# Patient Record
Sex: FEMALE | Race: OTHER | NOT HISPANIC OR LATINO
[De-identification: names, ages, dates, MRNs, and addresses within clinical notes are randomized per-mention and may not be internally consistent; named-entity substitution may affect disease eponyms.]

---

## 2018-08-15 ENCOUNTER — TRANSCRIPTION ENCOUNTER (OUTPATIENT)
Age: 43
End: 2018-08-15

## 2018-10-18 ENCOUNTER — TRANSCRIPTION ENCOUNTER (OUTPATIENT)
Age: 43
End: 2018-10-18

## 2023-03-08 ENCOUNTER — INPATIENT (INPATIENT)
Facility: HOSPITAL | Age: 48
LOS: 2 days | Discharge: HOME | DRG: 101 | End: 2023-03-11
Attending: FAMILY MEDICINE | Admitting: FAMILY MEDICINE
Payer: COMMERCIAL

## 2023-03-08 VITALS
OXYGEN SATURATION: 100 % | RESPIRATION RATE: 18 BRPM | HEART RATE: 80 BPM | SYSTOLIC BLOOD PRESSURE: 106 MMHG | DIASTOLIC BLOOD PRESSURE: 60 MMHG | TEMPERATURE: 98 F

## 2023-03-08 LAB
ALBUMIN SERPL ELPH-MCNC: 4.7 G/DL — SIGNIFICANT CHANGE UP (ref 3.5–5.2)
ALP SERPL-CCNC: 64 U/L — SIGNIFICANT CHANGE UP (ref 30–115)
ALT FLD-CCNC: 18 U/L — SIGNIFICANT CHANGE UP (ref 0–41)
ANION GAP SERPL CALC-SCNC: 13 MMOL/L — SIGNIFICANT CHANGE UP (ref 7–14)
APPEARANCE UR: CLEAR — SIGNIFICANT CHANGE UP
APTT BLD: 27.9 SEC — SIGNIFICANT CHANGE UP (ref 27–39.2)
AST SERPL-CCNC: 19 U/L — SIGNIFICANT CHANGE UP (ref 0–41)
BACTERIA # UR AUTO: ABNORMAL
BASOPHILS # BLD AUTO: 0.02 K/UL — SIGNIFICANT CHANGE UP (ref 0–0.2)
BASOPHILS NFR BLD AUTO: 0.1 % — SIGNIFICANT CHANGE UP (ref 0–1)
BILIRUB SERPL-MCNC: <0.2 MG/DL — SIGNIFICANT CHANGE UP (ref 0.2–1.2)
BILIRUB UR-MCNC: NEGATIVE — SIGNIFICANT CHANGE UP
BUN SERPL-MCNC: 9 MG/DL — LOW (ref 10–20)
CALCIUM SERPL-MCNC: 9.8 MG/DL — SIGNIFICANT CHANGE UP (ref 8.4–10.4)
CHLORIDE SERPL-SCNC: 103 MMOL/L — SIGNIFICANT CHANGE UP (ref 98–110)
CHOLEST SERPL-MCNC: 187 MG/DL — SIGNIFICANT CHANGE UP
CO2 SERPL-SCNC: 25 MMOL/L — SIGNIFICANT CHANGE UP (ref 17–32)
COLOR SPEC: SIGNIFICANT CHANGE UP
CREAT SERPL-MCNC: 0.8 MG/DL — SIGNIFICANT CHANGE UP (ref 0.7–1.5)
DIFF PNL FLD: ABNORMAL
EGFR: 90 ML/MIN/1.73M2 — SIGNIFICANT CHANGE UP
EOSINOPHIL # BLD AUTO: 0.02 K/UL — SIGNIFICANT CHANGE UP (ref 0–0.7)
EOSINOPHIL NFR BLD AUTO: 0.1 % — SIGNIFICANT CHANGE UP (ref 0–8)
EPI CELLS # UR: 6 /HPF — HIGH (ref 0–5)
GLUCOSE SERPL-MCNC: 98 MG/DL — SIGNIFICANT CHANGE UP (ref 70–99)
GLUCOSE UR QL: NEGATIVE — SIGNIFICANT CHANGE UP
HCG SERPL QL: NEGATIVE — SIGNIFICANT CHANGE UP
HCT VFR BLD CALC: 40.3 % — SIGNIFICANT CHANGE UP (ref 37–47)
HDLC SERPL-MCNC: 68 MG/DL — SIGNIFICANT CHANGE UP
HGB BLD-MCNC: 13.9 G/DL — SIGNIFICANT CHANGE UP (ref 12–16)
HYALINE CASTS # UR AUTO: 1 /LPF — SIGNIFICANT CHANGE UP (ref 0–7)
IMM GRANULOCYTES NFR BLD AUTO: 0.4 % — HIGH (ref 0.1–0.3)
INR BLD: 0.91 RATIO — SIGNIFICANT CHANGE UP (ref 0.65–1.3)
KETONES UR-MCNC: SIGNIFICANT CHANGE UP
LACTATE SERPL-SCNC: 2.6 MMOL/L — HIGH (ref 0.7–2)
LEUKOCYTE ESTERASE UR-ACNC: NEGATIVE — SIGNIFICANT CHANGE UP
LIDOCAIN IGE QN: 31 U/L — SIGNIFICANT CHANGE UP (ref 7–60)
LIPID PNL WITH DIRECT LDL SERPL: 107 MG/DL — HIGH
LYMPHOCYTES # BLD AUTO: 1.21 K/UL — SIGNIFICANT CHANGE UP (ref 1.2–3.4)
LYMPHOCYTES # BLD AUTO: 8.4 % — LOW (ref 20.5–51.1)
MAGNESIUM SERPL-MCNC: 2.1 MG/DL — SIGNIFICANT CHANGE UP (ref 1.8–2.4)
MCHC RBC-ENTMCNC: 31.8 PG — HIGH (ref 27–31)
MCHC RBC-ENTMCNC: 34.5 G/DL — SIGNIFICANT CHANGE UP (ref 32–37)
MCV RBC AUTO: 92.2 FL — SIGNIFICANT CHANGE UP (ref 81–99)
MONOCYTES # BLD AUTO: 0.75 K/UL — HIGH (ref 0.1–0.6)
MONOCYTES NFR BLD AUTO: 5.2 % — SIGNIFICANT CHANGE UP (ref 1.7–9.3)
NEUTROPHILS # BLD AUTO: 12.37 K/UL — HIGH (ref 1.4–6.5)
NEUTROPHILS NFR BLD AUTO: 85.8 % — HIGH (ref 42.2–75.2)
NITRITE UR-MCNC: NEGATIVE — SIGNIFICANT CHANGE UP
NON HDL CHOLESTEROL: 119 MG/DL — SIGNIFICANT CHANGE UP
NRBC # BLD: 0 /100 WBCS — SIGNIFICANT CHANGE UP (ref 0–0)
PH UR: 6.5 — SIGNIFICANT CHANGE UP (ref 5–8)
PLATELET # BLD AUTO: 251 K/UL — SIGNIFICANT CHANGE UP (ref 130–400)
POTASSIUM SERPL-MCNC: 4.1 MMOL/L — SIGNIFICANT CHANGE UP (ref 3.5–5)
POTASSIUM SERPL-SCNC: 4.1 MMOL/L — SIGNIFICANT CHANGE UP (ref 3.5–5)
PROT SERPL-MCNC: 7 G/DL — SIGNIFICANT CHANGE UP (ref 6–8)
PROT UR-MCNC: NEGATIVE — SIGNIFICANT CHANGE UP
PROTHROM AB SERPL-ACNC: 10.3 SEC — SIGNIFICANT CHANGE UP (ref 9.95–12.87)
RBC # BLD: 4.37 M/UL — SIGNIFICANT CHANGE UP (ref 4.2–5.4)
RBC # FLD: 11.6 % — SIGNIFICANT CHANGE UP (ref 11.5–14.5)
RBC CASTS # UR COMP ASSIST: 1 /HPF — SIGNIFICANT CHANGE UP (ref 0–4)
SODIUM SERPL-SCNC: 141 MMOL/L — SIGNIFICANT CHANGE UP (ref 135–146)
SP GR SPEC: 1.01 — SIGNIFICANT CHANGE UP (ref 1.01–1.03)
TRIGL SERPL-MCNC: 59 MG/DL — SIGNIFICANT CHANGE UP
TROPONIN T SERPL-MCNC: <0.01 NG/ML — SIGNIFICANT CHANGE UP
UROBILINOGEN FLD QL: SIGNIFICANT CHANGE UP
WBC # BLD: 14.43 K/UL — HIGH (ref 4.8–10.8)
WBC # FLD AUTO: 14.43 K/UL — HIGH (ref 4.8–10.8)
WBC UR QL: 2 /HPF — SIGNIFICANT CHANGE UP (ref 0–5)

## 2023-03-08 PROCEDURE — 99223 1ST HOSP IP/OBS HIGH 75: CPT

## 2023-03-08 PROCEDURE — 71045 X-RAY EXAM CHEST 1 VIEW: CPT | Mod: 26

## 2023-03-08 PROCEDURE — 70496 CT ANGIOGRAPHY HEAD: CPT | Mod: 26,MA

## 2023-03-08 PROCEDURE — 70553 MRI BRAIN STEM W/O & W/DYE: CPT | Mod: 26,MA

## 2023-03-08 PROCEDURE — 70498 CT ANGIOGRAPHY NECK: CPT | Mod: 26,MA

## 2023-03-08 NOTE — ED ADULT NURSE NOTE - OBJECTIVE STATEMENT
Patient BIBA. Family member reports finding patient on the floor vomiting from the mouth. Patient denies hitting head. Denies pain. Has history of epilepsy. NIH 0. No deformities noted. Patient A&Ox4.

## 2023-03-08 NOTE — ED ADULT TRIAGE NOTE - GLASGOW COMA SCALE: EYE OPENING, MLM
H/O  section    S/P appendectomy    S/P cholecystectomy    S/P foot surgery  in 2013.
(E4) spontaneous

## 2023-03-08 NOTE — ED PROVIDER NOTE - NS ED ATTENDING STATEMENT MOD
This was a shared visit with the SUKHDEV. I reviewed and verified the documentation and independently performed the documented:

## 2023-03-08 NOTE — CONSULT NOTE ADULT - ASSESSMENT
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 Zaragoza presenting today with an episode of forgetfulness/confusion lasting several hours. Patient states that she woke up fine and went about her routine activities and was working from home. The last ting she recalls is emailing one of her colleagues Yasmany at 11am and the next thing she recalls is that her son and daughter trying to talk to her and asking questions as to what happened at around 1pm. She states that she felt very confused and was unable to answer her kids for some time. On arrival to ed patient is back to her baseline mentation but still cannot recall what occurred between 11am to 1pm today. 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 Her last appointment with her neurologist was pre-covid. Patient has never had VEEG, but had multiple REEGs at her neurologist Dr Moon office which was abnormal as per patient. CTH negative for acute findings. CTA H/N revealed LICA moderate stenosis.      DDX:  Transient global amnesia Vs Breakthrough seizure    Plan  Please obtain lamotrigine levels  Please Continue Lamotrigine 200mg q 8 hrs   Obtain MRI brain w w/o contrast  REEG  Utox, TSH, HBAIC, Lipid profile  Seizure & Fall precautions  Keep Mg>2   Neuro attending note will follow

## 2023-03-08 NOTE — ED PROVIDER NOTE - ATTENDING APP SHARED VISIT CONTRIBUTION OF CARE
47-year-old female with h/o nonconvulsive seizures, ocular migraines, in the ER for evaluation of possible seizure.  Patient states she was working at home, remembers getting a work call around 11 am, then does not remember anything until she was woken up by her fiancé calling her on [employer] at 12:30 pm, found herself on floor of the bathroom vomiting.  Does not remember how she got there.  Patient reports having your typical ocular migraine this morning, took Excedrin and then it resolved.  Denies any HA now.  No neck pain.  No CP/SOB.  No palpitations.  No abdominal pain.  Not feeling nauseated in ER, no vomiting in ER.  No diarrhea.  Patient states her typical seizures usually manifest as episodes of vertigo.  PE - nad, nc/at, eomi, perrl, op - clear, mmm, neck supple, cta b/l, no w/r/r, rrr, abd- soft, nt/nd, nabs, from x 4, no LE swelling/tenderness, A&O x 3, cn 2-12 intact, motor 5/5 b/l UE and LE  -check labs, head ct, neuro eval. 47-year-old female with h/o nonconvulsive seizures, ocular migraines, in the ER for evaluation of possible seizure.  Patient states she was working at home, remembers getting a work call around 11 am, then does not remember anything until she was woken up by her fiancé calling her on [employer] at 12:30 pm, found herself on floor of the bathroom vomiting.  Does not remember how she got there.  Patient reports having her typical ocular migraine this morning, took Excedrin and then it resolved.  Denies any HA now.  No neck pain.  No CP/SOB.  No palpitations.  No abdominal pain.  Not feeling nauseated in ER, no vomiting in ER.  No diarrhea.  Patient states her typical seizures usually manifest as episodes of vertigo.  PE - nad, nc/at, eomi, perrl, op - clear, mmm, neck supple, cta b/l, no w/r/r, rrr, abd- soft, nt/nd, nabs, from x 4, no LE swelling/tenderness, A&O x 3, cn 2-12 intact, motor 5/5 b/l UE and LE  -check labs, head ct, neuro eval.

## 2023-03-08 NOTE — ED PROVIDER NOTE - OBJECTIVE STATEMENT
46 yo F with pmhx of non-convulsive seizures (gets dizzy) on lamictal (150mg BID) presenting with for evaluation of an episode where she is not quite sure what happened this morning from 10am-12pm. Patient states she remembers writing something for work around 10am then the next thing she remembers is her  calling her through the [employer] at the house where she was in the bathroom vomiting. Patient states she does not recall how she got the bathroom. As per cameras in house patient states she walking in her house dragging her sweater and held her head crying on the couch. Patient currently feels dizziness. States her last seizure was last year. Prior to this episode she felt an aura to her right eye and has experienced it twice before this in the last month and took excedrin. No cp, sob, fever, chills, abdominal pain, diarrhea, back pain, urinary symptoms, headache, paresthesias, or weakness.

## 2023-03-08 NOTE — ED PROVIDER NOTE - CLINICAL SUMMARY MEDICAL DECISION MAKING FREE TEXT BOX
47-year-old female with PMHx as noted, in the ER for evaluation of possible seizure earlier today.  Normal neuro exam in ER.  Labs reviewed: WBC 14, Hgb 13.9, CMP unremarkable.  EKG - NSR @60, no acute ischemic changes.  CT head with no acute abnormality.  CTA head/neck: Calcification at L common carotid artery bifurcation with moderate stenosis at origin of L ICA.  Patient seen and evaluated by neurology, placed in EDOU for continued monitoring and further evaluation.

## 2023-03-08 NOTE — CONSULT NOTE ADULT - SUBJECTIVE AND OBJECTIVE BOX
CC:HPI: HPI:      ROS:  Constitutional, Neurological, Psychiatric, Eyes, ENT, Cardiovascular, Respiratory, Gastrointestinal, Genitourinary, Musculoskeletal, Integumentary, Endocrine and Heme/Lymph are otherwise negative.     Physical Exam:  Constitutional: alert and in no acute distress.  Eyes: the sclera and conjunctiva were normal, pupils were equal in size, round, reactive to light, with normal accommodation and extraocular movements were intact.   Back: no costovertebral angle tenderness and no spinal tenderness.      Neuro Exam:  Orientation: Patient is a/o x4 , speech fluent, calm co-operative following all commands. Able to name objects, repeats words and sentences, able to calculate , remote memory is intact, 5min recall is 3/3  Attention: normal attention and comprehension.  Cranial Nerves: EOMI, VFF, Visual acuity intact, face is symmetric, facial sensation is intactMotor: muscle tone was normal in all four extremities, muscle strength was normal in all four extremities and normal bulk in all four extremities.   Sensory exam: light touch was intact.   Coordination:. normal gait. balance was intact. there was no past-pointing. no tremor present.   Deep tendon reflexes:   Biceps right 2+. Biceps left 2+.    Triceps right 2+. Triceps left 2+.  LOC  Brachioradialis right 2+. Brachioradialis left 2+.    Patella right 2+. Patella left 2+.    Ankle jerk right 2+. Ankle jerk left 2+.   Plantar responses normal on the right, normal on the left.      NIHSS: 0    Allergies    No Known Allergies    Intolerances      MEDICATIONS  (STANDING):    MEDICATIONS  (PRN):      LABS:                        13.9   14.43 )-----------( 251      ( 08 Mar 2023 17:20 )             40.3     03-08    141  |  103  |  9<L>  ----------------------------<  98  4.1   |  25  |  0.8    Ca    9.8      08 Mar 2023 17:20  Mg     2.1     03-08    TPro  7.0  /  Alb  4.7  /  TBili  <0.2  /  DBili  x   /  AST  19  /  ALT  18  /  AlkPhos  64  03-08    PT/INR - ( 08 Mar 2023 17:20 )   PT: 10.30 sec;   INR: 0.91 ratio         PTT - ( 08 Mar 2023 17:20 )  PTT:27.9 sec    Lactate, Blood (03.08.23 @ 17:20)   Lactate, Blood: 2.6: Elevated lactate        Neuro Imaging:  < from: CT Head No Cont (03.08.23 @ 16:41) >  IMPRESSION:    CT HEAD:  No evidence of acute intracranial hemorrhage, acute territorial infarct,   mass or midline shift.  No interval change when compared with CT of the head dated 9/8/2015.      CTA:  There is calcification at the left common carotid artery bifurcation with   moderate stenosis at the origin of the left internal carotid artery.  No evidence of major vascular occlusion or aneurysm.    --- End of Report ---    YASMANI ZACARIAS MD; Attending Interventional Radiologist  This document has been electronically signed. 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 Zaragoza presenting today with an episode of forgetfulness/confusion lasting several hours. Patient states that she woke up fine and went about her routine activities and was working from home. The last ting she recalls is emailing one of her colleagues Yasmany at 11am and the next thing she recalls is that her son and daughter trying to talk to her and asking questions as to what happened at around 1pm. She states that she felt very confused and was unable to answer her kids for some time. On arrival to ed patient is back to her baseline mentation but still cannot recall what occurred between 11am to 1pm today. 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 Patient has never had VEEG had multiple REEGs at her neurologist Dr Moon office which was abnormal as per patient.      Home Medications:  -Lamotrigine 200mg q 8hrs        Social History  -Denies drug abuse  -Social alcohol use  -Ex smoker , quit 20 years ago        Family History  Mother with HTN, HLD, multiple brain aneurysms with one rupture.      Neuro Exam:  Orientation: Patient is a/o x4 , speech fluent, calm co-operative following all commands. Able to name objects, repeats words and sentences, able to calculate , remote memory is intact, 5min recall is 3/3  Attention: normal attention and comprehension.  Cranial Nerves: EOMI, VFF, Visual acuity intact, face is symmetric, facial sensation is intact, speech, language and hearing is intact, tongue midline, normal shoulder shrug.  Motor: 5/5 throughout/ no drift             No abnormal mvmts             Normal muscle tone and bulk  Sensory exam: Intact and symmetric  Coordination:. no Dysmetria or limb ataxia  Gait: Steady     NIHSS: 0        Allergies    No Known Allergies    Intolerances      MEDICATIONS  (STANDING):    MEDICATIONS  (PRN):      LABS:                        13.9   14.43 )-----------( 251      ( 08 Mar 2023 17:20 )             40.3     03-08    141  |  103  |  9<L>  ----------------------------<  98  4.1   |  25  |  0.8    Ca    9.8      08 Mar 2023 17:20  Mg     2.1     03-08    TPro  7.0  /  Alb  4.7  /  TBili  <0.2  /  DBili  x   /  AST  19  /  ALT  18  /  AlkPhos  64  03-08    PT/INR - ( 08 Mar 2023 17:20 )   PT: 10.30 sec;   INR: 0.91 ratio         PTT - ( 08 Mar 2023 17:20 )  PTT:27.9 sec    Lactate, Blood (03.08.23 @ 17:20)   Lactate, Blood: 2.6: Elevated lactate        Neuro Imaging:  < from: CT Head No Cont (03.08.23 @ 16:41) >  IMPRESSION:    CT HEAD:  No evidence of acute intracranial hemorrhage, acute territorial infarct,   mass or midline shift.  No interval change when compared with CT of the head dated 9/8/2015.      CTA:  There is calcification at the left common carotid artery bifurcation with   moderate stenosis at the origin of the left internal carotid artery.  No evidence of major vascular occlusion or aneurysm.    --- End of Report ---    YASMANI ZACARIAS MD; Attending Interventional Radiologist  This document has been electronically signed. Mar  8 2023  5:28PM    < end of copied text >    EEG:     Echo:   Carotid Doppler: N/A  Telemetry:

## 2023-03-08 NOTE — CONSULT NOTE ADULT - NS ATTEND AMEND GEN_ALL_CORE FT
Patient seen and examined and agree with above except as noted.  Patients history, notes, labs, imaging, vitals and meds reviewed personally.  Patient with history of epilepsy since 2008 following with Dr. Zaragoza.  Takes lamictal BID however admits ot missing it atleast 10 doses per month.  Was on ER version of AED in past however was not able to afford after insurance change  Exam normal currently   Says she has a left temporal cyst (likely arachnoid cyst on prior MRI's done    Plan  1. Transfer to EMU  2. Continue lamictal 200mg q8 hours (confirm home dose)  3. F/u lamictal level  4. Magnesium >2.0  5. Seizure precautions and ativan PRN

## 2023-03-08 NOTE — ED PROVIDER NOTE - CONSIDERATION OF ADMISSION OBSERVATION
Pt in ER for possible seizure, seen and eval by neuro, to place in EDOU at this time for further eval. Consideration of Admission/Observation

## 2023-03-09 DIAGNOSIS — Z98.51 TUBAL LIGATION STATUS: Chronic | ICD-10-CM

## 2023-03-09 DIAGNOSIS — G40.89 OTHER SEIZURES: ICD-10-CM

## 2023-03-09 PROBLEM — Z00.00 ENCOUNTER FOR PREVENTIVE HEALTH EXAMINATION: Status: ACTIVE | Noted: 2023-03-09

## 2023-03-09 LAB
A1C WITH ESTIMATED AVERAGE GLUCOSE RESULT: 5.2 % — SIGNIFICANT CHANGE UP (ref 4–5.6)
ALBUMIN SERPL ELPH-MCNC: 4.2 G/DL — SIGNIFICANT CHANGE UP (ref 3.5–5.2)
ALP SERPL-CCNC: 52 U/L — SIGNIFICANT CHANGE UP (ref 30–115)
ALT FLD-CCNC: 15 U/L — SIGNIFICANT CHANGE UP (ref 0–41)
ANION GAP SERPL CALC-SCNC: 9 MMOL/L — SIGNIFICANT CHANGE UP (ref 7–14)
AST SERPL-CCNC: 19 U/L — SIGNIFICANT CHANGE UP (ref 0–41)
BASOPHILS # BLD AUTO: 0.03 K/UL — SIGNIFICANT CHANGE UP (ref 0–0.2)
BASOPHILS NFR BLD AUTO: 0.3 % — SIGNIFICANT CHANGE UP (ref 0–1)
BILIRUB SERPL-MCNC: 0.3 MG/DL — SIGNIFICANT CHANGE UP (ref 0.2–1.2)
BUN SERPL-MCNC: 6 MG/DL — LOW (ref 10–20)
CALCIUM SERPL-MCNC: 9.2 MG/DL — SIGNIFICANT CHANGE UP (ref 8.4–10.5)
CHLORIDE SERPL-SCNC: 103 MMOL/L — SIGNIFICANT CHANGE UP (ref 98–110)
CO2 SERPL-SCNC: 27 MMOL/L — SIGNIFICANT CHANGE UP (ref 17–32)
CREAT SERPL-MCNC: 0.9 MG/DL — SIGNIFICANT CHANGE UP (ref 0.7–1.5)
CULTURE RESULTS: NO GROWTH — SIGNIFICANT CHANGE UP
EGFR: 78 ML/MIN/1.73M2 — SIGNIFICANT CHANGE UP
EOSINOPHIL # BLD AUTO: 0.08 K/UL — SIGNIFICANT CHANGE UP (ref 0–0.7)
EOSINOPHIL NFR BLD AUTO: 0.9 % — SIGNIFICANT CHANGE UP (ref 0–8)
ESTIMATED AVERAGE GLUCOSE: 103 MG/DL — SIGNIFICANT CHANGE UP (ref 68–114)
GLUCOSE SERPL-MCNC: 98 MG/DL — SIGNIFICANT CHANGE UP (ref 70–99)
HCT VFR BLD CALC: 38.6 % — SIGNIFICANT CHANGE UP (ref 37–47)
HGB BLD-MCNC: 13.3 G/DL — SIGNIFICANT CHANGE UP (ref 12–16)
IMM GRANULOCYTES NFR BLD AUTO: 0.2 % — SIGNIFICANT CHANGE UP (ref 0.1–0.3)
LYMPHOCYTES # BLD AUTO: 2.07 K/UL — SIGNIFICANT CHANGE UP (ref 1.2–3.4)
LYMPHOCYTES # BLD AUTO: 22.9 % — SIGNIFICANT CHANGE UP (ref 20.5–51.1)
MAGNESIUM SERPL-MCNC: 2.1 MG/DL — SIGNIFICANT CHANGE UP (ref 1.8–2.4)
MCHC RBC-ENTMCNC: 31.1 PG — HIGH (ref 27–31)
MCHC RBC-ENTMCNC: 34.5 G/DL — SIGNIFICANT CHANGE UP (ref 32–37)
MCV RBC AUTO: 90.4 FL — SIGNIFICANT CHANGE UP (ref 81–99)
MONOCYTES # BLD AUTO: 0.85 K/UL — HIGH (ref 0.1–0.6)
MONOCYTES NFR BLD AUTO: 9.4 % — HIGH (ref 1.7–9.3)
NEUTROPHILS # BLD AUTO: 5.99 K/UL — SIGNIFICANT CHANGE UP (ref 1.4–6.5)
NEUTROPHILS NFR BLD AUTO: 66.3 % — SIGNIFICANT CHANGE UP (ref 42.2–75.2)
NRBC # BLD: 0 /100 WBCS — SIGNIFICANT CHANGE UP (ref 0–0)
PLATELET # BLD AUTO: 226 K/UL — SIGNIFICANT CHANGE UP (ref 130–400)
POTASSIUM SERPL-MCNC: 3.8 MMOL/L — SIGNIFICANT CHANGE UP (ref 3.5–5)
POTASSIUM SERPL-SCNC: 3.8 MMOL/L — SIGNIFICANT CHANGE UP (ref 3.5–5)
PROT SERPL-MCNC: 6.1 G/DL — SIGNIFICANT CHANGE UP (ref 6–8)
RBC # BLD: 4.27 M/UL — SIGNIFICANT CHANGE UP (ref 4.2–5.4)
RBC # FLD: 11.8 % — SIGNIFICANT CHANGE UP (ref 11.5–14.5)
SARS-COV-2 RNA SPEC QL NAA+PROBE: SIGNIFICANT CHANGE UP
SODIUM SERPL-SCNC: 139 MMOL/L — SIGNIFICANT CHANGE UP (ref 135–146)
SPECIMEN SOURCE: SIGNIFICANT CHANGE UP
WBC # BLD: 9.04 K/UL — SIGNIFICANT CHANGE UP (ref 4.8–10.8)
WBC # FLD AUTO: 9.04 K/UL — SIGNIFICANT CHANGE UP (ref 4.8–10.8)

## 2023-03-09 PROCEDURE — 99233 SBSQ HOSP IP/OBS HIGH 50: CPT

## 2023-03-09 PROCEDURE — 85025 COMPLETE CBC W/AUTO DIFF WBC: CPT

## 2023-03-09 PROCEDURE — 95716 VEEG EA 12-26HR CONT MNTR: CPT

## 2023-03-09 PROCEDURE — 36415 COLL VENOUS BLD VENIPUNCTURE: CPT

## 2023-03-09 PROCEDURE — 83735 ASSAY OF MAGNESIUM: CPT

## 2023-03-09 PROCEDURE — 80053 COMPREHEN METABOLIC PANEL: CPT

## 2023-03-09 PROCEDURE — 80175 DRUG SCREEN QUAN LAMOTRIGINE: CPT

## 2023-03-09 PROCEDURE — 99221 1ST HOSP IP/OBS SF/LOW 40: CPT

## 2023-03-09 PROCEDURE — 99223 1ST HOSP IP/OBS HIGH 75: CPT

## 2023-03-09 PROCEDURE — 95700 EEG CONT REC W/VID EEG TECH: CPT

## 2023-03-09 RX ORDER — LAMOTRIGINE 25 MG/1
150 TABLET, ORALLY DISINTEGRATING ORAL
Refills: 0 | Status: DISCONTINUED | OUTPATIENT
Start: 2023-03-09 | End: 2023-03-09

## 2023-03-09 RX ORDER — ACETAMINOPHEN 500 MG
650 TABLET ORAL EVERY 6 HOURS
Refills: 0 | Status: DISCONTINUED | OUTPATIENT
Start: 2023-03-09 | End: 2023-03-11

## 2023-03-09 RX ORDER — IBUPROFEN 200 MG
400 TABLET ORAL EVERY 6 HOURS
Refills: 0 | Status: DISCONTINUED | OUTPATIENT
Start: 2023-03-09 | End: 2023-03-11

## 2023-03-09 RX ORDER — LAMOTRIGINE 25 MG/1
1 TABLET, ORALLY DISINTEGRATING ORAL
Qty: 0 | Refills: 0 | DISCHARGE

## 2023-03-09 RX ORDER — ONDANSETRON 8 MG/1
4 TABLET, FILM COATED ORAL ONCE
Refills: 0 | Status: COMPLETED | OUTPATIENT
Start: 2023-03-09 | End: 2023-03-09

## 2023-03-09 RX ORDER — LAMOTRIGINE 25 MG/1
200 TABLET, ORALLY DISINTEGRATING ORAL
Refills: 0 | Status: DISCONTINUED | OUTPATIENT
Start: 2023-03-09 | End: 2023-03-09

## 2023-03-09 RX ORDER — LAMOTRIGINE 25 MG/1
150 TABLET, ORALLY DISINTEGRATING ORAL EVERY 12 HOURS
Refills: 0 | Status: DISCONTINUED | OUTPATIENT
Start: 2023-03-09 | End: 2023-03-11

## 2023-03-09 RX ADMIN — Medication 400 MILLIGRAM(S): at 21:13

## 2023-03-09 RX ADMIN — Medication 650 MILLIGRAM(S): at 15:30

## 2023-03-09 RX ADMIN — Medication 650 MILLIGRAM(S): at 16:06

## 2023-03-09 RX ADMIN — LAMOTRIGINE 150 MILLIGRAM(S): 25 TABLET, ORALLY DISINTEGRATING ORAL at 21:13

## 2023-03-09 RX ADMIN — ONDANSETRON 4 MILLIGRAM(S): 8 TABLET, FILM COATED ORAL at 08:44

## 2023-03-09 NOTE — ED CDU PROVIDER DISPOSITION NOTE - ATTENDING CONTRIBUTION TO CARE
47-year-old woman, history of seizure disorder on Lamictal with recent med adjustment was placed in CDU after syncope versus seizure.  ED work-up was unremarkable.  On my exam patient is nontoxic-appearing, but says she still feels not quite 100%.  Spoke with neuro, recommended admission to [facility] for video EEG.

## 2023-03-09 NOTE — ED CDU PROVIDER INITIAL DAY NOTE - ATTENDING APP SHARED VISIT CONTRIBUTION OF CARE
47-year-old woman, history of seizure disorder on Lamictal with recent med adjustment was placed in CDU after syncope versus seizure.  ED work-up was unremarkable.  On my exam patient is nontoxic-appearing, but says she still feels not quite 100%.  Given that patient has a known seizure disorder, will speak with neuro regarding admission for EEG.

## 2023-03-09 NOTE — PATIENT PROFILE ADULT - FALL HARM RISK - HARM RISK INTERVENTIONS

## 2023-03-09 NOTE — ED CDU PROVIDER INITIAL DAY NOTE - CLINICAL SUMMARY MEDICAL DECISION MAKING FREE TEXT BOX
47-year-old woman, history of seizure disorder on Lamictal with recent med adjustment was placed in CDU after syncope versus seizure.  ED work-up was unremarkable.  On my exam patient is nontoxic-appearing, but says she still feels not quite 100%.  Spoke with neuro, recommended admission to [facility] for video EEG.

## 2023-03-09 NOTE — ED CDU PROVIDER INITIAL DAY NOTE - OBJECTIVE STATEMENT
48 yo F with pmhx of non-convulsive seizures (gets dizzy) on lamictal (150mg BID) presenting with for evaluation of an episode where she is not quite sure what happened this morning from 10am-12pm. Patient states she remembers writing something for work around 10am then the next thing she remembers is her  calling her through the [employer] at the house where she was in the bathroom vomiting. Patient states she does not recall how she got the bathroom. As per cameras in house patient states she walking in her house dragging her sweater and held her head crying on the couch. Patient currently feels dizziness. States her last seizure was last year. Prior to this episode she felt an aura to her right eye and has experienced it twice before this in the last month and took excedrin. No cp, sob, fever, chills, abdominal pain, diarrhea, back pain, urinary symptoms, headache, paresthesias, or weakness.

## 2023-03-09 NOTE — H&P ADULT - ASSESSMENT
Seizure:   Not complaint with Anti-Seizure medication.   Neurology recommend 24 hrs video EEG.   Continue Lamictal   Seizure precaution.  Seizure:   Not complaint with Anti-Seizure medication.   Neurology recommend 24 hrs video EEG.   Continue Lamictal 150mg BID.   Seizure precaution.     DVT prophylaxis: Lovenox SC.

## 2023-03-09 NOTE — ED CDU PROVIDER DISPOSITION NOTE - CLINICAL COURSE
Patient has history of seizure non-compliant with AEDs, coming in with episode of seizure, neurology attending assessed patient and wants patient to be admitted to [facility]. MRI brain read is pending, and Echo is pending to be done.

## 2023-03-09 NOTE — H&P ADULT - NSHPREVIEWOFSYSTEMS_GEN_ALL_CORE
She feels dizzy, no blurry vision, she has mild headache, no ear pain, no cough, chest pain, SOB, no abdominal pain or diarrhea, no focal weakness or numbness, no joints pain or rash.

## 2023-03-09 NOTE — ED ADULT NURSE REASSESSMENT NOTE - NS ED NURSE REASSESS COMMENT FT1
pt received from previous nurse. pt reassessed. pt c/o nausea. MD Pruitt made aware. awaiting zofran order. safety and cardiac monitoring maintained. pt a&ox4. will cont plan of care.

## 2023-03-09 NOTE — ED CDU PROVIDER INITIAL DAY NOTE - WR ORDER NAME 1
Patient understands condition, prognosis and need for follow up care. Xray Chest 1 View-PORTABLE IMMEDIATE

## 2023-03-09 NOTE — H&P ADULT - NSHPLABSRESULTS_GEN_ALL_CORE
CBC Full  -  ( 08 Mar 2023 17:20 )  WBC Count : 14.43 K/uL  RBC Count : 4.37 M/uL  Hemoglobin : 13.9 g/dL  Hematocrit : 40.3 %  Platelet Count - Automated : 251 K/uL  Mean Cell Volume : 92.2 fL  Mean Cell Hemoglobin : 31.8 pg  Mean Cell Hemoglobin Concentration : 34.5 g/dL  Auto Neutrophil # : 12.37 K/uL  Auto Lymphocyte # : 1.21 K/uL  Auto Monocyte # : 0.75 K/uL  Auto Eosinophil # : 0.02 K/uL  Auto Basophil # : 0.02 K/uL  Auto Neutrophil % : 85.8 %  Auto Lymphocyte % : 8.4 %  Auto Monocyte % : 5.2 %  Auto Eosinophil % : 0.1 %  Auto Basophil % : 0.1 %    < from: CT Head No Cont (03.08.23 @ 16:41) >    MPRESSION:    CT HEAD:  No evidence of acute intracranial hemorrhage, acute territorial infarct,   mass or midline shift.  No interval change when compared with CT of the head dated 9/8/2015.      CTA:  There is calcification at the left common carotid artery bifurcation with   moderate stenosis at the origin of the left internal carotid artery.  No evidence of major vascular occlusion or aneurysm.    < end of copied text >    < from: MR Head w/wo IV Cont (03.08.23 @ 22:37) >    IMPRESSION:    No acute intracranial pathology or abnormal enhancement.    Few nonspecific white matter signal hyperintensities potentially   reflecting mild chronic microvascular type changes.    < end of copied text >

## 2023-03-09 NOTE — PATIENT PROFILE ADULT - NSPROLASTMENSTRUAL_GEN_A_NUR

## 2023-03-09 NOTE — H&P ADULT - HISTORY OF PRESENT ILLNESS
47 year old female with PMH of Seizure disorder  reported to ED for possible seizure episode, patient doesn't recall what happened this morning from 10am-12pm. Patient states she remembers writing something for work around 10am then the next thing she remembers is her  calling her through the [employer] at the house where she was in the bathroom vomiting. Patient states she does not recall how she got the bathroom. As per cameras in house patient states she walking in her house dragging her sweater and held her head crying on the couch. Patient currently feels dizziness. States her last seizure was last year. In the ED Head CT was stable. Brain MRI showed no acute Pathology

## 2023-03-10 PROCEDURE — 99231 SBSQ HOSP IP/OBS SF/LOW 25: CPT

## 2023-03-10 PROCEDURE — 95720 EEG PHY/QHP EA INCR W/VEEG: CPT

## 2023-03-10 RX ADMIN — LAMOTRIGINE 150 MILLIGRAM(S): 25 TABLET, ORALLY DISINTEGRATING ORAL at 21:04

## 2023-03-10 RX ADMIN — LAMOTRIGINE 150 MILLIGRAM(S): 25 TABLET, ORALLY DISINTEGRATING ORAL at 09:32

## 2023-03-11 ENCOUNTER — TRANSCRIPTION ENCOUNTER (OUTPATIENT)
Age: 48
End: 2023-03-11

## 2023-03-11 VITALS
HEART RATE: 66 BPM | SYSTOLIC BLOOD PRESSURE: 132 MMHG | RESPIRATION RATE: 18 BRPM | TEMPERATURE: 98 F | DIASTOLIC BLOOD PRESSURE: 62 MMHG | OXYGEN SATURATION: 96 %

## 2023-03-11 LAB
ALBUMIN SERPL ELPH-MCNC: 4.5 G/DL — SIGNIFICANT CHANGE UP (ref 3.5–5.2)
ALP SERPL-CCNC: 58 U/L — SIGNIFICANT CHANGE UP (ref 30–115)
ALT FLD-CCNC: 18 U/L — SIGNIFICANT CHANGE UP (ref 0–41)
ANION GAP SERPL CALC-SCNC: 11 MMOL/L — SIGNIFICANT CHANGE UP (ref 7–14)
AST SERPL-CCNC: 26 U/L — SIGNIFICANT CHANGE UP (ref 0–41)
BASOPHILS # BLD AUTO: 0.03 K/UL — SIGNIFICANT CHANGE UP (ref 0–0.2)
BASOPHILS NFR BLD AUTO: 0.4 % — SIGNIFICANT CHANGE UP (ref 0–1)
BILIRUB SERPL-MCNC: 0.3 MG/DL — SIGNIFICANT CHANGE UP (ref 0.2–1.2)
BUN SERPL-MCNC: 9 MG/DL — LOW (ref 10–20)
CALCIUM SERPL-MCNC: 9.6 MG/DL — SIGNIFICANT CHANGE UP (ref 8.4–10.5)
CHLORIDE SERPL-SCNC: 103 MMOL/L — SIGNIFICANT CHANGE UP (ref 98–110)
CO2 SERPL-SCNC: 25 MMOL/L — SIGNIFICANT CHANGE UP (ref 17–32)
CREAT SERPL-MCNC: 0.9 MG/DL — SIGNIFICANT CHANGE UP (ref 0.7–1.5)
EGFR: 78 ML/MIN/1.73M2 — SIGNIFICANT CHANGE UP
EOSINOPHIL # BLD AUTO: 0.09 K/UL — SIGNIFICANT CHANGE UP (ref 0–0.7)
EOSINOPHIL NFR BLD AUTO: 1.3 % — SIGNIFICANT CHANGE UP (ref 0–8)
GLUCOSE SERPL-MCNC: 86 MG/DL — SIGNIFICANT CHANGE UP (ref 70–99)
HCT VFR BLD CALC: 41.8 % — SIGNIFICANT CHANGE UP (ref 37–47)
HGB BLD-MCNC: 14.5 G/DL — SIGNIFICANT CHANGE UP (ref 12–16)
IMM GRANULOCYTES NFR BLD AUTO: 0.1 % — SIGNIFICANT CHANGE UP (ref 0.1–0.3)
LYMPHOCYTES # BLD AUTO: 1.92 K/UL — SIGNIFICANT CHANGE UP (ref 1.2–3.4)
LYMPHOCYTES # BLD AUTO: 28.5 % — SIGNIFICANT CHANGE UP (ref 20.5–51.1)
MAGNESIUM SERPL-MCNC: 2.2 MG/DL — SIGNIFICANT CHANGE UP (ref 1.8–2.4)
MCHC RBC-ENTMCNC: 31.5 PG — HIGH (ref 27–31)
MCHC RBC-ENTMCNC: 34.7 G/DL — SIGNIFICANT CHANGE UP (ref 32–37)
MCV RBC AUTO: 90.7 FL — SIGNIFICANT CHANGE UP (ref 81–99)
MONOCYTES # BLD AUTO: 0.97 K/UL — HIGH (ref 0.1–0.6)
MONOCYTES NFR BLD AUTO: 14.4 % — HIGH (ref 1.7–9.3)
NEUTROPHILS # BLD AUTO: 3.71 K/UL — SIGNIFICANT CHANGE UP (ref 1.4–6.5)
NEUTROPHILS NFR BLD AUTO: 55.3 % — SIGNIFICANT CHANGE UP (ref 42.2–75.2)
NRBC # BLD: 0 /100 WBCS — SIGNIFICANT CHANGE UP (ref 0–0)
PLATELET # BLD AUTO: 242 K/UL — SIGNIFICANT CHANGE UP (ref 130–400)
POTASSIUM SERPL-MCNC: 4.1 MMOL/L — SIGNIFICANT CHANGE UP (ref 3.5–5)
POTASSIUM SERPL-SCNC: 4.1 MMOL/L — SIGNIFICANT CHANGE UP (ref 3.5–5)
PROT SERPL-MCNC: 7.1 G/DL — SIGNIFICANT CHANGE UP (ref 6–8)
RBC # BLD: 4.61 M/UL — SIGNIFICANT CHANGE UP (ref 4.2–5.4)
RBC # FLD: 11.5 % — SIGNIFICANT CHANGE UP (ref 11.5–14.5)
SODIUM SERPL-SCNC: 139 MMOL/L — SIGNIFICANT CHANGE UP (ref 135–146)
WBC # BLD: 6.73 K/UL — SIGNIFICANT CHANGE UP (ref 4.8–10.8)
WBC # FLD AUTO: 6.73 K/UL — SIGNIFICANT CHANGE UP (ref 4.8–10.8)

## 2023-03-11 PROCEDURE — 95720 EEG PHY/QHP EA INCR W/VEEG: CPT

## 2023-03-11 PROCEDURE — 99238 HOSP IP/OBS DSCHRG MGMT 30/<: CPT

## 2023-03-11 RX ORDER — IBUPROFEN 200 MG
400 TABLET ORAL EVERY 6 HOURS
Refills: 0 | Status: DISCONTINUED | OUTPATIENT
Start: 2023-03-11 | End: 2023-03-11

## 2023-03-11 RX ADMIN — Medication 400 MILLIGRAM(S): at 09:41

## 2023-03-11 RX ADMIN — LAMOTRIGINE 150 MILLIGRAM(S): 25 TABLET, ORALLY DISINTEGRATING ORAL at 09:27

## 2023-03-11 NOTE — PROGRESS NOTE ADULT - ASSESSMENT
Seizure:   Not complaint with Anti-Seizure medication.   Neurology recommend 24 hrs video EEG.   Seizure precaution.     DVT prophylaxis: Lovenox SC. 
Seizure:   Not complaint with Anti-Seizure medication.   Neurology recommend video EEG.   Seizure precaution.   Management per neurology
48 yo with h/o Epilepsy admitted for workup prolonged altered mental status. VEEG overnight normal. Will continue VEEG one more night to assess for seizure activity. No change to Lamotrigine dose

## 2023-03-11 NOTE — DISCHARGE NOTE NURSING/CASE MANAGEMENT/SOCIAL WORK - PATIENT PORTAL LINK FT
You can access the FollowMyHealth Patient Portal offered by [facility] by registering at the following website: [email or web address]. By joining [facility]’s FollowMyHealth portal, you will also be able to view your health information using other applications (apps) compatible with our system.

## 2023-03-11 NOTE — DISCHARGE NOTE PROVIDER - CARE PROVIDER_API CALL
Victoria Zaragoza (MD)  Neurology; Neurophysiology  [address] [place] [address]  Phone: [phone]  Fax: [phone]  Follow Up Time:

## 2023-03-11 NOTE — EEG REPORT - NS EEG TEXT BOX
VIDEO EEG FINAL REPORT      VARINDER LU    47y Female  MRN [id number]      Recording Technique: The patient underwent continuous video-EEG monitoring using Telemetry System hardware on the XL [employer] Digital System. EEG and video data were stored on a computer hard drive with important events saved in digital archive files. The material was reviewed by a physician (electroencephalographer/epileptologist) on a daily basis. Mack and seizure detection algorithms were utilized if needed. An EEG technician attended to the patient for 8 to 10 hours per day. The patient was observed by the epilepsy nursing staff 24 hrs per day. The epilepsy center neurologist was available in person or on call 24 hours per day.    Placement and Labeling of Eelectrodes: The EEG was performed using at least 20 channel referential EEG connections (coronal over temporal over parasaggital montage) with inferior temporal electrodes when indicting and using all standard 10-20 electrode placement with EKG, with additional electrodes placed in the inferior temporal region using the modified 10-10 montage electrode placements for elective admissions, or if deemed necessary. Recording was at a sampling rate of 256 samples per second per channel. Time syncronized digital video recording was done simultaneously with EEG recording. A low light infrared camera was used for low light recording.      HPI:  47 year old female with PMH of Seizure disorder  reported to ED for possible seizure episode, patient doesn't recall what happened this morning from 10am-12pm. Patient states she remembers writing something for work around 10am then the next thing she remembers is her  calling her through the [place] at the house where she was in the bathroom vomiting. Patient states she does not recall how she got the bathroom. As per cameras in house patient states she walking in her house dragging her sweater and held her head crying on the couch. Patient currently feels dizziness. States her last seizure was last year. In the ED Head CT was stable. Brain MRI showed no acute Pathology  (09 Mar 2023 12:04)      MEDICATIONS  (STANDING):  lamoTRIgine 150 milliGRAM(s) Oral every 12 hours    MEDICATIONS  (PRN):  acetaminophen     Tablet .. 650 milliGRAM(s) Oral every 6 hours PRN Temp greater or equal to 38C (100.4F), Mild Pain (1 - 3)  ibuprofen  Tablet. 400 milliGRAM(s) Oral every 6 hours PRN Mild Pain (1 - 3)  ibuprofen  Tablet. 400 milliGRAM(s) Oral every 6 hours PRN Moderate Pain (4 - 6)  LORazepam   Injectable 2 milliGRAM(s) IV Push three times a day PRN generalized tonic-clonic seizure lasting longer than 2 minutes, or two consecutive seizures without return to baseline in-between        AWAKE  The recording during the wakefulness consists of a symmetrical and well-organized background and posterior dominant rhythm in the range of 10 Hz, which is reactive to eye opening and eye closure and change in the level of alertness.      ASLEEP  Different stages of sleep were preserved well. Stage II of non-REM sleep was characterized by the presence of symmetrical and well-defined sleep spindles and vertex sharp waves. The deeper stages of sleep were identified by the presence of low theta and delta range activity seen diffusely over both hemispheres and more prominently from the frontal and central derivations. All stages captured and symmetric.      GENERALIZED SLOWING  None    FOCAL SLOWING    None  BREACH ARTIFACT  None    ACTIVATION PROCEDURES  Hyperventilation:  Photic Stimulation:    NONE  SLEEP DEPRIVATION/MEDICATION REDUCTION      EPILEPTIFORM ACTIVITY  None          EVENTS/SEIZURES    None  IMPRESSION  Normal VEEG     CLINICAL CORRELATION  A normal VEEG study does not exclude the clinical diagnosis of epilepsy, but no supporting evidence was present on this study.       MD YASIR Mcgee  Epilepsy Attending, [facility]   Professor, Neurology and Pediatrics, [employer] at [facility].

## 2023-03-11 NOTE — DISCHARGE NOTE PROVIDER - NSDCCPCAREPLAN_GEN_ALL_CORE_FT
PRINCIPAL DISCHARGE DIAGNOSIS  Diagnosis: Seizure  Assessment and Plan of Treatment: Follow with Dr. Zaragoza

## 2023-03-11 NOTE — DISCHARGE NOTE PROVIDER - HOSPITAL COURSE
Admitted for VEEG    Per neurology can be discharged with outpatient follow up with Dr. Zaragoza, no changes to AED.

## 2023-03-11 NOTE — DISCHARGE NOTE NURSING/CASE MANAGEMENT/SOCIAL WORK - NSDCPEFALRISK_GEN_ALL_CORE
For information on Fall & Injury Prevention, visit: [email or web address] OR  [email or web address] OR  https://www.cdc.gov/steadi/patient.html

## 2023-03-11 NOTE — PROGRESS NOTE ADULT - SUBJECTIVE AND OBJECTIVE BOX
47 year old female with PMH of Seizure disorder  reported to ED for possible seizure episode      No overnight events          REVIEW OF SYSTEMS:  No complaints      MEDICATIONS  (STANDING):  lamoTRIgine 150 milliGRAM(s) Oral every 12 hours    MEDICATIONS  (PRN):  acetaminophen     Tablet .. 650 milliGRAM(s) Oral every 6 hours PRN Temp greater or equal to 38C (100.4F), Mild Pain (1 - 3)  ibuprofen  Tablet. 400 milliGRAM(s) Oral every 6 hours PRN Mild Pain (1 - 3)  ibuprofen  Tablet. 400 milliGRAM(s) Oral every 6 hours PRN Moderate Pain (4 - 6)  LORazepam   Injectable 2 milliGRAM(s) IV Push three times a day PRN generalized tonic-clonic seizure lasting longer than 2 minutes, or two consecutive seizures without return to baseline in-between      Allergies  No Known Allergies          Vital Signs Last 24 Hrs  T(C): 36.4 (11 Mar 2023 05:01), Max: 36.8 (10 Mar 2023 21:09)  T(F): 97.6 (11 Mar 2023 05:01), Max: 98.3 (10 Mar 2023 21:09)  HR: 62 (11 Mar 2023 05:01) (54 - 79)  BP: 109/74 (11 Mar 2023 05:01) (109/74 - 136/62)  RR: 18 (11 Mar 2023 05:01) (18 - 18)        PHYSICAL EXAM:  GENERAL: NAD, well-groomed, well-developed  HEAD:  Atraumatic, Normocephalic  EYES: EOMI, PERRLA, conjunctiva and sclera clear  ENMT: No tonsillar erythema, exudates, or enlargement; Moist mucous membranes, Good dentition, No lesions  NECK: Supple, No JVD, Normal thyroid  NERVOUS SYSTEM:  Alert & Oriented X3, Good concentration; Motor Strength 5/5 B/L upper and lower extremities  CHEST/LUNG: CTA bilaterally; No rales, rhonchi, wheezing, or rubs  HEART: Regular rate and rhythm; No murmurs, rubs, or gallops  ABDOMEN: Soft, Nontender, Nondistended; Bowel sounds present      LABS:                        14.5   6.73  )-----------( 242      ( 11 Mar 2023 07:24 )             41.8     03-11    139  |  103  |  9<L>  ----------------------------<  86  4.1   |  25  |  0.9    Ca    9.6      11 Mar 2023 07:24  Mg     2.2     03-11    TPro  7.1  /  Alb  4.5  /  TBili  0.3  /  DBili  x   /  AST  26  /  ALT  18  /  AlkPhos  58  03-11    
[id number]  VARINDER LU  47y    Female    Allergies: No Known Allergies      Medications: acetaminophen     Tablet .. 650 milliGRAM(s) Oral every 6 hours PRN  ibuprofen  Tablet. 400 milliGRAM(s) Oral every 6 hours PRN  lamoTRIgine 150 milliGRAM(s) Oral every 12 hours  LORazepam   Injectable 2 milliGRAM(s) IV Push three times a day PRN      T(C): 36.8 (03-10-23 @ 05:00), Max: 36.8 (03-10-23 @ 05:00)  HR: 66 (03-10-23 @ 05:00) (52 - 73)  BP: 105/63 (03-10-23 @ 05:00) (105/63 - 126/74)  RR: 18 (03-10-23 @ 05:00) (18 - 18)  SpO2: 97% (03-09-23 @ 13:15) (97% - 98%)      No events overnight.  VEEG normal. Full report in chart      < from: MR Head w/wo IV Cont (03.08.23 @ 22:37) >  ACC: [id number] EXAM:  MR BRAIN WAW IC   ORDERED BY: JOSE CABRAL     PROCEDURE DATE:  03/08/2023          INTERPRETATION:  CLINICAL INDICATION: Altered mental status. Dizziness.    TECHNIQUE: Multi-planar multi-sequential MR imaging of the brain was   performed before and after the administration of 6.5 cc Gadavist contrast.    COMPARISON: MR brain 9/9/2015, CT head 3/8/2023    FINDINGS:    There is no evidence of acute infarct, intracranial hemorrhage, mass   effect, or midline shift.    There are few scattered subcortical white matter T2 and FLAIR signal   hyperintensities which are nonenhancing, for example series 6 image 18.    There is no abnormal intracranial enhancement.    The ventricles and sulci are age-appropriate in size.    There is no extra-axial fluid collection.    The major intracranial flow voids are maintained.    The orbits, sella, and suprasellar structures and craniocervical junction   are unremarkable.    The calvarium demonstrates normal signal intensity.    The visualized paranasal sinuses and tympanomastoid cavities are clear.      IMPRESSION:    No acute intracranial pathology or abnormal enhancement.    Few nonspecific white matter signal hyperintensities potentially   reflecting mild chronic microvascular type changes.    --- End of Report ---          ELIZABETH JENNINGS MD; Resident Radiologist  This document has been electronically signed.  JUANITA BAKER MD; Attending Radiologist  This document has been electronically signed. Mar  9 2023 10:01AM    < end of copied text >      PHYSICAL EXAM:    Awake and conversive. In NAD    Neurological: CN II-XII in tact. No nystagmus. Full strength throughout            
47 year old female with PMH of Seizure disorder  reported to ED for possible seizure episode, patient doesn't recall what happened.    Today:  Seen at bedside, no new complaints.              REVIEW OF SYSTEMS:  No complaints    MEDICATIONS  (STANDING):  lamoTRIgine 150 milliGRAM(s) Oral every 12 hours    MEDICATIONS  (PRN):  acetaminophen     Tablet .. 650 milliGRAM(s) Oral every 6 hours PRN Temp greater or equal to 38C (100.4F), Mild Pain (1 - 3)  ibuprofen  Tablet. 400 milliGRAM(s) Oral every 6 hours PRN Mild Pain (1 - 3)  LORazepam   Injectable 2 milliGRAM(s) IV Push three times a day PRN generalized tonic-clonic seizure lasting longer than 2 minutes, or two consecutive seizures without return to baseline in-between      Allergies  No Known Allergies          Vital Signs Last 24 Hrs  T(C): 36.8 (10 Mar 2023 05:00), Max: 36.8 (10 Mar 2023 05:00)  T(F): 98.3 (10 Mar 2023 05:00), Max: 98.3 (10 Mar 2023 05:00)  HR: 66 (10 Mar 2023 05:00) (52 - 67)  BP: 105/63 (10 Mar 2023 05:00) (105/63 - 126/74)  RR: 18 (10 Mar 2023 05:00) (18 - 18)  SpO2: 97% (09 Mar 2023 13:15) (97% - 97%)    Parameters below as of 09 Mar 2023 13:15  Patient On (Oxygen Delivery Method): room air        PHYSICAL EXAM:  GENERAL: NAD, well-groomed, well-developed  HEAD:  Atraumatic, Normocephalic  EYES: EOMI, PERRLA, conjunctiva and sclera clear  ENMT: No tonsillar erythema, exudates, or enlargement; Moist mucous membranes, Good dentition, No lesions  NECK: Supple, No JVD, Normal thyroid  NERVOUS SYSTEM:  Alert & Oriented X3, Good concentration  CHEST/LUNG: CTA bilaterally; No rales, rhonchi, wheezing, or rubs  HEART: Regular rate and rhythm; No murmurs, rubs, or gallops  ABDOMEN: Soft, Nontender, Nondistended; Bowel sounds present  EXTREMITIES:  2+ Peripheral Pulses, No clubbing, cyanosis, or edema  SKIN: No rashes or lesions      LABS:                        13.3   9.04  )-----------( 226      ( 09 Mar 2023 19:10 )             38.6     03-    139  |  103  |  6<L>  ----------------------------<  98  3.8   |  27  |  0.9    Ca    9.2      09 Mar 2023 19:10  Mg     2.1     -    TPro  6.1  /  Alb  4.2  /  TBili  0.3  /  DBili  x   /  AST  19  /  ALT  15  /  AlkPhos  52  03-09    PT/INR - ( 08 Mar 2023 17:20 )   PT: 10.30 sec;   INR: 0.91 ratio         PTT - ( 08 Mar 2023 17:20 )  PTT:27.9 sec  Urinalysis Basic - ( 08 Mar 2023 17:20 )    Color: Light Yellow / Appearance: Clear / S.009 / pH: x  Gluc: x / Ketone: Trace  / Bili: Negative / Urobili: <2 mg/dL   Blood: x / Protein: Negative / Nitrite: Negative   Leuk Esterase: Negative / RBC: 1 /HPF / WBC 2 /HPF   Sq Epi: x / Non Sq Epi: 6 /HPF / Bacteria: Moderate      
· Subjective and Objective:   [id number]  VARINDER LU  47y    Female    Allergies: No Known Allergies      Medications: acetaminophen     Tablet .. 650 milliGRAM(s) Oral every 6 hours PRN  ibuprofen  Tablet. 400 milliGRAM(s) Oral every 6 hours PRN  lamoTRIgine 150 milliGRAM(s) Oral every 12 hours  LORazepam   Injectable 2 milliGRAM(s) IV Push three times a day PRN      T(C): 36.8 (03-10-23 @ 05:00), Max: 36.8 (03-10-23 @ 05:00)  HR: 66 (03-10-23 @ 05:00) (52 - 73)  BP: 105/63 (03-10-23 @ 05:00) (105/63 - 126/74)  RR: 18 (03-10-23 @ 05:00) (18 - 18)  SpO2: 97% (03-09-23 @ 13:15) (97% - 98%)      No events overnight.  VEEG normal. Full report in chart      < from: MR Head w/wo IV Cont (03.08.23 @ 22:37) >  ACC: [id number] EXAM:  MR BRAIN WAW IC   ORDERED BY: JOSE CABRAL     PROCEDURE DATE:  03/08/2023          INTERPRETATION:  CLINICAL INDICATION: Altered mental status. Dizziness.    TECHNIQUE: Multi-planar multi-sequential MR imaging of the brain was   performed before and after the administration of 6.5 cc Gadavist contrast.    COMPARISON: MR brain 9/9/2015, CT head 3/8/2023    FINDINGS:    There is no evidence of acute infarct, intracranial hemorrhage, mass   effect, or midline shift.    There are few scattered subcortical white matter T2 and FLAIR signal   hyperintensities which are nonenhancing, for example series 6 image 18.    There is no abnormal intracranial enhancement.    The ventricles and sulci are age-appropriate in size.    There is no extra-axial fluid collection.    The major intracranial flow voids are maintained.    The orbits, sella, and suprasellar structures and craniocervical junction   are unremarkable.    The calvarium demonstrates normal signal intensity.    The visualized paranasal sinuses and tympanomastoid cavities are clear.      IMPRESSION:    No acute intracranial pathology or abnormal enhancement.    Few nonspecific white matter signal hyperintensities potentially   reflecting mild chronic microvascular type changes.    --- End of Report ---          ELIZABETH JENNINGS MD; Resident Radiologist  This document has been electronically signed.  JUANITA BAKER MD; Attending Radiologist  This document has been electronically signed. Mar  9 2023 10:01AM    < end of copied text >  VEEG: normal, see report in chart.     PHYSICAL EXAM:    Awake and conversive. In NAD    Neurological: CN II-XII in tact. No nystagmus. Full strength throughout                Assessment and Plan:   · Assessment	  48 yo with h/o Epilepsy admitted for workup prolonged altered mental status. VEEG overnight normal. No change to Lamotrigine dose  Can be dc'd today   F/u Outpatient neurologist Dr. Zaragoza

## 2023-03-11 NOTE — DISCHARGE NOTE NURSING/CASE MANAGEMENT/SOCIAL WORK - NSPROMEDSRETURNEDYESNO_GEN_A_NUR
no report of nursing staff holding onto patient's home meds; SAMI Mason confirms no med in pharmacy for this patient.

## 2023-03-13 LAB
LAMOTRIGINE SERPL-MCNC: 3.9 UG/ML — SIGNIFICANT CHANGE UP (ref 2–20)
LAMOTRIGINE SERPL-MCNC: <1 UG/ML — LOW (ref 2–20)

## 2023-03-15 DIAGNOSIS — Z91.14 PATIENT'S OTHER NONCOMPLIANCE WITH MEDICATION REGIMEN: ICD-10-CM

## 2023-03-15 DIAGNOSIS — G40.909 EPILEPSY, UNSPECIFIED, NOT INTRACTABLE, WITHOUT STATUS EPILEPTICUS: ICD-10-CM

## 2023-03-16 PROBLEM — R56.9 UNSPECIFIED CONVULSIONS: Chronic | Status: ACTIVE | Noted: 2023-03-09

## 2023-04-08 ENCOUNTER — NON-APPOINTMENT (OUTPATIENT)
Age: 48
End: 2023-04-08

## 2023-05-10 ENCOUNTER — APPOINTMENT (OUTPATIENT)
Dept: NEUROLOGY | Facility: CLINIC | Age: 48
End: 2023-05-10

## 2023-09-13 ENCOUNTER — NON-APPOINTMENT (OUTPATIENT)
Age: 48
End: 2023-09-13

## 2023-09-13 DIAGNOSIS — Z82.49 FAMILY HISTORY OF ISCHEMIC HEART DISEASE AND OTHER DISEASES OF THE CIRCULATORY SYSTEM: ICD-10-CM

## 2023-09-13 DIAGNOSIS — G37.9 DEMYELINATING DISEASE OF CENTRAL NERVOUS SYSTEM, UNSPECIFIED: ICD-10-CM

## 2023-09-13 DIAGNOSIS — G43.909 MIGRAINE, UNSPECIFIED, NOT INTRACTABLE, W/OUT STATUS MIGRAINOSUS: ICD-10-CM

## 2023-09-13 DIAGNOSIS — Z86.19 PERSONAL HISTORY OF OTHER INFECTIOUS AND PARASITIC DISEASES: ICD-10-CM

## 2023-09-13 DIAGNOSIS — Z92.89 PERSONAL HISTORY OF OTHER MEDICAL TREATMENT: ICD-10-CM

## 2023-09-13 RX ORDER — LAMOTRIGINE 200 MG/1
200 TABLET ORAL TWICE DAILY
Refills: 0 | Status: ACTIVE | COMMUNITY

## 2024-02-22 NOTE — ED PROVIDER NOTE - PRINCIPAL DIAGNOSIS
02/22/24 0930   Team Meeting   Meeting Type Daily Rounds   Team Members Present   Team Members Present Physician;Nurse;;Other (Discipline and Name);Occupational Therapist   Physician Team Member Dr. Marietta MD; SHANI Rubio   Nursing Team Member Lety Olmedo, JOSÉ MIGUEL   Care Management Team Member Elif Laws, MS, NCC, LPC; Tali Rendon MSW   OT Team Member Selma Arrington, MARIS   Other (Discipline and Name) Pharmacey   Patient/Family Present   Patient Present No   Patient's Family Present No   Will d/c today to [place] [facility] and f/u w/ [facility]. Prn. Denies si/hi/avh. Trans at 1200.    Altered mental state

## 2024-05-01 ENCOUNTER — TRANSCRIPTION ENCOUNTER (OUTPATIENT)
Age: 49
End: 2024-05-01

## 2024-05-01 ENCOUNTER — APPOINTMENT (OUTPATIENT)
Dept: NEUROLOGY | Facility: CLINIC | Age: 49
End: 2024-05-01
Payer: COMMERCIAL

## 2024-05-01 VITALS
DIASTOLIC BLOOD PRESSURE: 75 MMHG | SYSTOLIC BLOOD PRESSURE: 113 MMHG | OXYGEN SATURATION: 97 % | HEART RATE: 74 BPM | RESPIRATION RATE: 16 BRPM | HEIGHT: 62 IN | BODY MASS INDEX: 24.11 KG/M2 | WEIGHT: 131 LBS

## 2024-05-01 DIAGNOSIS — G93.9 DISORDER OF BRAIN, UNSPECIFIED: ICD-10-CM

## 2024-05-01 DIAGNOSIS — G93.0 CEREBRAL CYSTS: ICD-10-CM

## 2024-05-01 DIAGNOSIS — G40.909 EPILEPSY, UNSPECIFIED, NOT INTRACTABLE, W/OUT STATUS EPILEPTICUS: ICD-10-CM

## 2024-05-01 PROCEDURE — 99214 OFFICE O/P EST MOD 30 MIN: CPT

## 2024-05-01 RX ORDER — LAMOTRIGINE 200 MG/1
200 TABLET, EXTENDED RELEASE ORAL DAILY
Qty: 180 | Refills: 3 | Status: ACTIVE | COMMUNITY
Start: 2024-05-01 | End: 1900-01-01

## 2024-05-01 NOTE — HISTORY OF PRESENT ILLNESS
[FreeTextEntry1] : Last visit was 5/2023.  Since last visit, she is compliant with prescribed medication of lamotrigine. No breakthrough seizure. No aura: typically visual aura. 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 Annual physical is current.  In the past, topiramate gave her side effect of depression. She had suicidal thought.  Last clinical seizure was 3/2023, when she was noncompliant with medication. The breakthrough seizure was CPZ.  Was treated since 2012.  She had prior MRA in 4/2023, which was unremarkable.  Stress level was high since 4/2022 since her mom was diagnosed of cerebral aneurysm, ruptured and treated surgically. She is now in NH. 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

## 2024-05-01 NOTE — PHYSICAL EXAM
[FreeTextEntry1] : General Appearance - Well groomed, Not in acute distress. Build & Nutrition - Well nourished.  Neurologic Mental Status Affect - normal. Speech - Normal. Thought content/perception - Normal. Cognitive function - Normal. Cranial Nerves I Olfactory - Normal. II Optic - Visual fields - Normal. III Oculomotor - Normal Bilaterally. IV Trochlear - Bilateral - Normal - Bilateral. V Trigeminal - Normal Bilaterally. VI Abducens - Bilateral - Normal - Bilateral. VII Facial - Normal Bilaterally. VIII Acoustic - Bilateral - Hearing normal - Bilateral. IX Glossopharyngeal / X Vagus - Normal. XI Accessory - Normal Bilaterally. XII Hypoglossal - Bilateral - Normal - Bilateral. Sensory - Normal. Motor Bulk and Contour - Normal. Tone - Normal. Reflexes (Dermatomes) 2/2 Normal - All. Plantar Reflexes (L4-S2) - Bilateral - Flexion - Bilateral. Coordination - Normal. Gait - Normal. Frontal Release - None.  Results of Diagnostic Studies  Labs: Note: 9/8/15 Lamictal level 7.4 (400mg/day) 9/9/15 lamotrigine 8.1. (400mg/day), 6/4/19 normal LFT, lamotrigine 1.1 (200mg/day). 5/8/23 lamotrigine 6.5 (400mg/day).  Imaging: Imaging - MRI - Note: 9/9/15 brain: suspicious right parietal juxtacortical white matter lesion, left frontal cyst unchanged. 9/25/15 MRA head: negative. 1/6/18 brain: unchanged left frontal cyst, no definite white matter lesion. 5/9/19 brain in a different facility, reported same cyst and comparative review with previous study did not show increase in size. Images reviewed: no increase in white matter lesion. 4/8/23 MRA head and neck reported no aneurysm. The left mesial frontal cyst, 4mm unchanged. Images reviewed.   Neurophysiological Testing - Note: 9/15/15 EEG: normal. 10/6/15 Eps: normal in all modalities. 1/3/18 EEG: rare sharp transients left frontotemporal region. 5/21/19 EEG: similar findings with 2018. 6/9/2020 EEG: rare sharp transients left frontal temporal region. 5/5/23 EEG: rare sharp waves left frontal temporal region.

## 2024-05-01 NOTE — DISCUSSION/SUMMARY
[FreeTextEntry1] : Effective seizure control with good compliance of AED. Prior non- to CPZ. Check lamotrigine level and EEG. Continue same dosage of lamotrigine. Try to switch to ER for easier dosing.

## 2024-06-10 ENCOUNTER — APPOINTMENT (OUTPATIENT)
Dept: NEUROLOGY | Facility: CLINIC | Age: 49
End: 2024-06-10

## 2024-12-02 NOTE — ED ADULT NURSE NOTE - [facility] [employer], [facility]
Well , 4 Years Old  Well-child exams are visits with a health care provider to track your child's growth and development at certain ages. The following information tells you what to expect during this visit and gives you some helpful tips about caring for your child.  What immunizations does my child need?  Diphtheria and tetanus toxoids and acellular pertussis (DTaP) vaccine.  Inactivated poliovirus vaccine.  Influenza vaccine, also called a flu shot. A yearly (annual) flu shot is recommended.  Measles, mumps, and rubella (MMR) vaccine.  Varicella vaccine.  Other vaccines may be suggested to catch up on any missed vaccines or if your child has certain high-risk conditions.  For more information about vaccines, talk to your child's health care provider or go to the Centers for Disease Control and Prevention website for immunization schedules: www.cdc.gov/vaccines/schedules  What tests does my child need?  Physical exam    Your child's health care provider will complete a physical exam of your child.  Your child's health care provider will measure your child's height, weight, and head size. The health care provider will compare the measurements to a growth chart to see how your child is growing.  Vision  Starting at age 6, have your child's vision checked every 2 years if he or she does not have symptoms of vision problems. Finding and treating eye problems early is important for your child's learning and development.  If an eye problem is found, your child may need to have his or her vision checked every year (instead of every 2 years). Your child may also:  Be prescribed glasses.  Have more tests done.  Need to visit an eye specialist.  Other tests  Talk with your child's health care provider about the need for certain screenings. Depending on your child's risk factors, the health care provider may screen for:  Low red blood cell count (anemia).  Hearing problems.  Lead poisoning.  Tuberculosis  (TB).  High cholesterol.  High blood sugar (glucose).  Your child's health care provider will measure your child's body mass index (BMI) to screen for obesity.  Your child should have his or her blood pressure checked at least once a year.  Caring for your child  Parenting tips  Recognize your child's desire for privacy and independence. When appropriate, give your child a chance to solve problems by himself or herself. Encourage your child to ask for help when needed.  Ask your child about school and friends regularly. Keep close contact with your child's teacher at school.  Have family rules such as bedtime, screen time, TV watching, chores, and safety. Give your child chores to do around the house.  Set clear behavioral boundaries and limits. Discuss the consequences of good and bad behavior. Praise and reward positive behaviors, improvements, and accomplishments.  Correct or discipline your child in private. Be consistent and fair with discipline.  Do not hit your child or let your child hit others.  Talk with your child's health care provider if you think your child is hyperactive, has a very short attention span, or is very forgetful.  Oral health    Your child may start to lose baby teeth and get his or her first back teeth (molars).  Continue to check your child's toothbrushing and encourage regular flossing. Make sure your child is brushing twice a day (in the morning and before bed) and using fluoride toothpaste.  Schedule regular dental visits for your child. Ask your child's dental care provider if your child needs sealants on his or her permanent teeth.  Give fluoride supplements as told by your child's health care provider.  Sleep  Children at this age need 9-12 hours of sleep a day. Make sure your child gets enough sleep.  Continue to stick to bedtime routines. Reading every night before bedtime may help your child relax.  Try not to let your child watch TV or have screen time before bedtime.  If your  child frequently has problems sleeping, discuss these problems with your child's health care provider.  Elimination  Nighttime bed-wetting may still be normal, especially for boys or if there is a family history of bed-wetting.  It is best not to punish your child for bed-wetting.  If your child is wetting the bed during both daytime and nighttime, contact your child's health care provider.  General instructions  Talk with your child's health care provider if you are worried about access to food or housing.  What's next?  Your next visit will take place when your child is 5 years old.  Summary  Starting at age 6, have your child's vision checked every 2 years. If an eye problem is found, your child may need to have his or her vision checked every year.  Your child may start to lose baby teeth and get his or her first back teeth (molars). Check your child's toothbrushing and encourage regular flossing.  Continue to keep bedtime routines. Try not to let your child watch TV before bedtime. Instead, encourage your child to do something relaxing before bed, such as reading.  When appropriate, give your child an opportunity to solve problems by himself or herself. Encourage your child to ask for help when needed.  This information is not intended to replace advice given to you by your health care provider. Make sure you discuss any questions you have with your health care provider.  Document Revised: 12/19/2022 Document Reviewed: 12/19/2022  [employer] Patient Education © 2023 [employer]     Patent

## 2025-03-06 ENCOUNTER — APPOINTMENT (OUTPATIENT)
Dept: NEUROLOGY | Facility: CLINIC | Age: 50
End: 2025-03-06

## 2025-05-08 ENCOUNTER — RX RENEWAL (OUTPATIENT)
Age: 50
End: 2025-05-08